# Patient Record
Sex: MALE
[De-identification: names, ages, dates, MRNs, and addresses within clinical notes are randomized per-mention and may not be internally consistent; named-entity substitution may affect disease eponyms.]

---

## 2020-08-10 ENCOUNTER — APPOINTMENT (OUTPATIENT)
Dept: ELECTROPHYSIOLOGY | Facility: CLINIC | Age: 25
End: 2020-08-10
Payer: MEDICAID

## 2020-08-10 VITALS
HEART RATE: 74 BPM | BODY MASS INDEX: 38.67 KG/M2 | WEIGHT: 311 LBS | HEIGHT: 75 IN | TEMPERATURE: 97.5 F | SYSTOLIC BLOOD PRESSURE: 129 MMHG | OXYGEN SATURATION: 99 % | DIASTOLIC BLOOD PRESSURE: 68 MMHG

## 2020-08-10 DIAGNOSIS — Z82.49 FAMILY HISTORY OF ISCHEMIC HEART DISEASE AND OTHER DISEASES OF THE CIRCULATORY SYSTEM: ICD-10-CM

## 2020-08-10 DIAGNOSIS — R00.2 PALPITATIONS: ICD-10-CM

## 2020-08-10 DIAGNOSIS — G47.39 OTHER SLEEP APNEA: ICD-10-CM

## 2020-08-10 DIAGNOSIS — Z78.9 OTHER SPECIFIED HEALTH STATUS: ICD-10-CM

## 2020-08-10 DIAGNOSIS — Z86.39 PERSONAL HISTORY OF OTHER ENDOCRINE, NUTRITIONAL AND METABOLIC DISEASE: ICD-10-CM

## 2020-08-10 PROBLEM — Z00.00 ENCOUNTER FOR PREVENTIVE HEALTH EXAMINATION: Status: ACTIVE | Noted: 2020-08-10

## 2020-08-10 PROCEDURE — 93000 ELECTROCARDIOGRAM COMPLETE: CPT

## 2020-08-10 PROCEDURE — 99203 OFFICE O/P NEW LOW 30 MIN: CPT

## 2020-08-10 RX ORDER — PROPRANOLOL HYDROCHLORIDE 10 MG/1
10 TABLET ORAL TWICE DAILY
Refills: 0 | Status: ACTIVE | COMMUNITY
Start: 2020-07-09

## 2020-08-10 RX ORDER — ERGOCALCIFEROL 1.25 MG/1
1.25 MG CAPSULE, LIQUID FILLED ORAL
Qty: 4 | Refills: 0 | Status: ACTIVE | COMMUNITY
Start: 2020-08-06

## 2020-08-10 NOTE — DISCUSSION/SUMMARY
[FreeTextEntry1] : Rodolfo Godoson is a 25y/o man with Hx of recurring palpitations who presents today for initial evaluation. \par \par Impression:\par \par 1. Palpitations: EKG today NSR. Recent Holter monitor with Cardiologist revealed sinus tachycardia. Resume propranolol as prescribed. Encouraged weight loss. \par \par 2. TAIWO: given waking up at night with dyspnea, concern for sleep apnea. Recommend sleep study to assess for TAIWO. \par \par Will continue f/u with Cardiologist and may RTO as needed or if any new or worsening symptoms or findings occur.

## 2020-08-10 NOTE — PHYSICAL EXAM
[General Appearance - Well Developed] : well developed [Normal Appearance] : normal appearance [Well Groomed] : well groomed [No Deformities] : no deformities [General Appearance - Well Nourished] : well nourished [Normal Conjunctiva] : the conjunctiva exhibited no abnormalities [General Appearance - In No Acute Distress] : no acute distress [No Oral Pallor] : no oral pallor [Eyelids - No Xanthelasma] : the eyelids demonstrated no xanthelasmas [Normal Oral Mucosa] : normal oral mucosa [Normal Jugular Venous A Waves Present] : normal jugular venous A waves present [No Oral Cyanosis] : no oral cyanosis [Normal Jugular Venous V Waves Present] : normal jugular venous V waves present [No Jugular Venous Jacome A Waves] : no jugular venous jacome A waves [Heart Rate And Rhythm] : heart rate and rhythm were normal [Murmurs] : no murmurs present [Heart Sounds] : normal S1 and S2 [Respiration, Rhythm And Depth] : normal respiratory rhythm and effort [Abdomen Soft] : soft [Exaggerated Use Of Accessory Muscles For Inspiration] : no accessory muscle use [Auscultation Breath Sounds / Voice Sounds] : lungs were clear to auscultation bilaterally [Abdomen Tenderness] : non-tender [Abdomen Mass (___ Cm)] : no abdominal mass palpated [Nail Clubbing] : no clubbing of the fingernails [Gait - Sufficient For Exercise Testing] : the gait was sufficient for exercise testing [Abnormal Walk] : normal gait [Cyanosis, Localized] : no localized cyanosis [Petechial Hemorrhages (___cm)] : no petechial hemorrhages [Skin Color & Pigmentation] : normal skin color and pigmentation [] : no rash [No Venous Stasis] : no venous stasis [Skin Lesions] : no skin lesions [No Skin Ulcers] : no skin ulcer [No Xanthoma] : no  xanthoma was observed [Oriented To Time, Place, And Person] : oriented to person, place, and time [Affect] : the affect was normal [Mood] : the mood was normal [No Anxiety] : not feeling anxious

## 2020-08-12 ENCOUNTER — NON-APPOINTMENT (OUTPATIENT)
Age: 25
End: 2020-08-12

## 2023-02-15 NOTE — HISTORY OF PRESENT ILLNESS
DOS 2/17/2023.  Pre procedural telephone interview complete with patient.  Patient verbalizes correct arrival time as instructed at 1100 at [facility], NPO status after midnight, and medications to take Amlodipine, Synthroid, and Thyroid with only sips of water as per anesthesia protocol on DOS.  Patient instructed to bring a photo ID, insurance, card/s, and a list of current medications.  Patient instructed to shower with Hibiclens soap the night before and morning of surgery and NOT to wear any jewlery, lotions, or powders.  After hospital discharge patient will be going home with  Gerald.  Patient acknowledged understanding to the plan of care and had no further questions/concerns.     Patient aware if they are ill (cough, fever, etc) to call their MD.  Patient aware of current coronavirus precautions for hospital and masking, current visitor policy and  parking no longer available.  
[FreeTextEntry1] : Josh Villanueva MD\par \par Rodolfo Goodson is a 25y/o man with Hx of recurring palpitations who presents today for initial evaluation. He was hospitalized back in July for high heart rate in the 170s with symptoms of chest discomfort. Was given nitro spray. Followed with his PCP who started him on propranolol to take as needed. Started exercising and was feeling well and then had a second occurrence last week of high heart rate into the 180s. Was at work. EMS came, EKG was reportedly normal. Was seen in the ER and all was normal. Monitors his heart rates utilizing a pulse oximeter. Has woken up in the past feeling out of breath. Began having chest discomfort and palpitations and uses his pulse oximeter which shows heart rates into the 140s-180s. Episodes often come at rest. Episodes occur suddenly and can gradually go away. No known triggers or aggravating factors. Denies syncope or near syncope. Had recent stress test for chest pain with Cardiologist. Just completed Holter monitoring, unsure of results. Had multiple episodes of palpitations while wearing the monitor.